# Patient Record
Sex: MALE | Race: WHITE | Employment: OTHER | URBAN - METROPOLITAN AREA
[De-identification: names, ages, dates, MRNs, and addresses within clinical notes are randomized per-mention and may not be internally consistent; named-entity substitution may affect disease eponyms.]

---

## 2024-01-30 LAB
ALBUMIN SERPL-MCNC: 4.1 G/DL
ALP BLD-CCNC: 88 U/L
ALT SERPL-CCNC: 18 U/L
ANION GAP SERPL CALCULATED.3IONS-SCNC: NORMAL MMOL/L
AST SERPL-CCNC: 19 U/L
BASOPHILS ABSOLUTE: 0 /ΜL
BASOPHILS RELATIVE PERCENT: 0.2 %
BILIRUB SERPL-MCNC: 0.4 MG/DL (ref 0.1–1.4)
BUN BLDV-MCNC: 16 MG/DL
CALCIUM SERPL-MCNC: 9.1 MG/DL
CHLORIDE BLD-SCNC: 102 MMOL/L
CHOLESTEROL, TOTAL: 135 MG/DL
CHOLESTEROL/HDL RATIO: NORMAL
CO2: 24 MMOL/L
CREAT SERPL-MCNC: 1 MG/DL
EGFR: 77
EOSINOPHILS ABSOLUTE: 0 /ΜL
EOSINOPHILS RELATIVE PERCENT: 0.6 %
FERRITIN: 310 NG/ML (ref 18–300)
GLUCOSE BLD-MCNC: 163 MG/DL
HCT VFR BLD CALC: 35.7 % (ref 41–53)
HDLC SERPL-MCNC: 58 MG/DL (ref 35–70)
HEMOGLOBIN: 11.8 G/DL (ref 13.5–17.5)
IRON % SATURATION: 45
IRON: 101
LDL CHOLESTEROL CALCULATED: 60 MG/DL (ref 0–160)
LYMPHOCYTES ABSOLUTE: 1.1 /ΜL
LYMPHOCYTES RELATIVE PERCENT: 20.8 %
MCH RBC QN AUTO: 29.3 PG
MCHC RBC AUTO-ENTMCNC: 33.1 G/DL
MCV RBC AUTO: 88.6 FL
MONOCYTES ABSOLUTE: 0.6 /ΜL
MONOCYTES RELATIVE PERCENT: 11.6 %
NEUTROPHILS ABSOLUTE: 3.6 /ΜL
NEUTROPHILS RELATIVE PERCENT: 66.6 %
NONHDLC SERPL-MCNC: NORMAL MG/DL
PDW BLD-RTO: 14.2 %
PLATELET # BLD: 193 K/ΜL
PMV BLD AUTO: 11.5 FL
POTASSIUM SERPL-SCNC: 4.2 MMOL/L
RBC # BLD: 4.03 10^6/ΜL
SODIUM BLD-SCNC: 139 MMOL/L
T4 FREE: 2.45
TOTAL IRON BINDING CAPACITY: 222
TOTAL PROTEIN: 5.8
TRIGL SERPL-MCNC: 88 MG/DL
TSH SERPL DL<=0.05 MIU/L-ACNC: 1.72 UIU/ML
VLDLC SERPL CALC-MCNC: 17 MG/DL
WBC # BLD: 5.4 10^3/ML

## 2024-02-04 NOTE — PROGRESS NOTES
Patient Name: Compa Vergara  Patient : 1945  Patient MRN: [id number]     Primary Oncologist: Codey Siddiqi MD  Referring Provider: Devon Omer MD     Date of Service: 2024      Chief Complaint:   Chief Complaint   Patient presents with    Follow-up     He came in for follow-up visit.    Patient Active Problem List:     CAD (coronary artery disease)     Hypertension     Hyperlipidemia     Diabetes mellitus (HCC)     Single implantable cardioverter-defibrillator (ICD) in situ     Automatic implantable cardioverter-defibrillator in situ     History of colon polyps     Gastroesophageal reflux disease without esophagitis     Type 2 diabetes mellitus without complication (HCC)     Patellofemoral syndrome of right knee     Implantable cardioverter-defibrillator (ICD) at end of device life     Iron deficiency anemia     Colon polyp     History of total right knee replacement     HPI:   Compa Vergara is a pleasant 78-year-old  male patient who was referred for evaluation of anemia.  He had upper and lower endoscopic study by Dr. Mondragon in . No prior history of blood transfusions.    In 2015 his white cell count was 8.4, RBC 4.51, hemoglobin 13.1, hematocrit 39.8, MCV 88.1, platelet 193, with normal differentiations.  In 2015 he had normal triglyceride, cholesterol, HDL.  TSH was 4.15.  Free T4 was 1.2.  CMP was within normal limits, except for glucose 119.  White cell count 6.6, hemoglobin 13, RBC 4.57, platelets 181, and relative monocyte was 11 percent.  In 2015 uric acid was 5.9, white cell count 5.8, RBC count 4.2, hemoglobin 12.1, hematocrit 38.1, MCV 90.7, platelets 167.    He has been taking aspirin for more than 20 years.    Blood pressure in 2015 showed white cell count 7.6, hemoglobin 12.5, platelet 175, MCV 89, absolute neutrophil 5.2, .  Iron was 97, ferritin 193, B12 529.8, folate more than 20, haptoglobin 187, retic 15.3, elevated.  One

## 2024-02-06 ENCOUNTER — TELEPHONE (OUTPATIENT)
Dept: PULMONOLOGY | Age: 79
End: 2024-02-06

## 2024-02-07 ENCOUNTER — OFFICE VISIT (OUTPATIENT)
Dept: PULMONOLOGY | Age: 79
End: 2024-02-07
Payer: MEDICARE

## 2024-02-07 VITALS
SYSTOLIC BLOOD PRESSURE: 124 MMHG | DIASTOLIC BLOOD PRESSURE: 62 MMHG | WEIGHT: 193.8 LBS | HEIGHT: 67 IN | BODY MASS INDEX: 30.42 KG/M2 | HEART RATE: 84 BPM | OXYGEN SATURATION: 98 %

## 2024-02-07 DIAGNOSIS — R06.00 DYSPNEA, UNSPECIFIED TYPE: Primary | ICD-10-CM

## 2024-02-07 PROCEDURE — G8427 DOCREV CUR MEDS BY ELIG CLIN: HCPCS | Performed by: NURSE PRACTITIONER

## 2024-02-07 PROCEDURE — G8417 CALC BMI ABV UP PARAM F/U: HCPCS | Performed by: NURSE PRACTITIONER

## 2024-02-07 PROCEDURE — 1123F ACP DISCUSS/DSCN MKR DOCD: CPT | Performed by: NURSE PRACTITIONER

## 2024-02-07 PROCEDURE — 3078F DIAST BP <80 MM HG: CPT | Performed by: NURSE PRACTITIONER

## 2024-02-07 PROCEDURE — G8484 FLU IMMUNIZE NO ADMIN: HCPCS | Performed by: NURSE PRACTITIONER

## 2024-02-07 PROCEDURE — 1036F TOBACCO NON-USER: CPT | Performed by: NURSE PRACTITIONER

## 2024-02-07 PROCEDURE — 99204 OFFICE O/P NEW MOD 45 MIN: CPT | Performed by: NURSE PRACTITIONER

## 2024-02-07 PROCEDURE — 3074F SYST BP LT 130 MM HG: CPT | Performed by: NURSE PRACTITIONER

## 2024-02-07 ASSESSMENT — ENCOUNTER SYMPTOMS: SHORTNESS OF BREATH: 1

## 2024-02-07 NOTE — PROGRESS NOTES
Compa Vergara (:  1945) is a 78 y.o. male,New patient, here for evaluation of the following chief complaint(s):  Shortness of Breath        Subjective   SUBJECTIVE/OBJECTIVE:  Compa Vergara is a very pleasant 79 yo male who was referred to pulmonary clinic because of having episodes of shortness of breath. He presents calm, relaxed and no sign or symptoms of shortness of breath at rest. He is accompanied by his wife, Melita. He has a very supportive family. Karena provided most of his health history and help with decision making.  Compa states that he quit smoking years ago and that he works outside in his garden. 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 Shortness of breath. Irregularity of right lung. He smoked 3 packs a day for 40 years. He quit smoking in . Worked outside [employer] collection. Stopped drinking, quit smoking. He does not remember being diagnosed with COPD.     He denies ever being infected with Covid-19. Both he and his spouse state that they are up to date on Covid, Flu and pneumonia vaccines. Compa states that  they both had head colds after returning to [place] from [place].  Other than that, they endorse being healthy. Wife, Karena states that Compa has a tremedous family hisitory of cancer. Compa is followed by Dr. Siddiqi, oncologist for anemia.     Compa carries co-morbidities of CAD, PAD, HLD, HTN, GERD, iron deficiency anemia, DM2 and has an AICD.     I will continue to monitor him from a pulmonary standpoint.       Shortness of Breath        Review of Systems   Respiratory:  Positive for shortness of breath.    All other systems reviewed and are negative.         Objective   Physical Exam  Vitals and nursing note reviewed. Exam conducted with a chaperone present (wife, Karena).   Constitutional:       General: He is awake.      Appearance: Normal appearance. He is well-developed and well-groomed.

## 2024-02-08 PROBLEM — R06.00 DYSPNEA: Status: ACTIVE | Noted: 2024-02-08

## 2024-02-14 ENCOUNTER — HOSPITAL ENCOUNTER (OUTPATIENT)
Dept: INFUSION THERAPY | Age: 79
Discharge: HOME | End: 2024-02-14
Payer: MEDICARE

## 2024-02-14 ENCOUNTER — OFFICE VISIT (OUTPATIENT)
Dept: ONCOLOGY | Age: 79
End: 2024-02-14
Payer: MEDICARE

## 2024-02-14 VITALS
BODY MASS INDEX: 31.85 KG/M2 | HEIGHT: 66 IN | SYSTOLIC BLOOD PRESSURE: 132 MMHG | HEART RATE: 66 BPM | WEIGHT: 198.2 LBS | TEMPERATURE: 97.7 F | OXYGEN SATURATION: 100 % | DIASTOLIC BLOOD PRESSURE: 63 MMHG

## 2024-02-14 DIAGNOSIS — D64.9 ANEMIA, UNSPECIFIED TYPE: Primary | ICD-10-CM

## 2024-02-14 PROCEDURE — 3078F DIAST BP <80 MM HG: CPT | Performed by: INTERNAL MEDICINE

## 2024-02-14 PROCEDURE — G8428 CUR MEDS NOT DOCUMENT: HCPCS | Performed by: INTERNAL MEDICINE

## 2024-02-14 PROCEDURE — G8484 FLU IMMUNIZE NO ADMIN: HCPCS | Performed by: INTERNAL MEDICINE

## 2024-02-14 PROCEDURE — 1036F TOBACCO NON-USER: CPT | Performed by: INTERNAL MEDICINE

## 2024-02-14 PROCEDURE — 99213 OFFICE O/P EST LOW 20 MIN: CPT | Performed by: INTERNAL MEDICINE

## 2024-02-14 PROCEDURE — G8417 CALC BMI ABV UP PARAM F/U: HCPCS | Performed by: INTERNAL MEDICINE

## 2024-02-14 PROCEDURE — 99211 OFF/OP EST MAY X REQ PHY/QHP: CPT

## 2024-02-14 PROCEDURE — 3075F SYST BP GE 130 - 139MM HG: CPT | Performed by: INTERNAL MEDICINE

## 2024-02-14 PROCEDURE — 1123F ACP DISCUSS/DSCN MKR DOCD: CPT | Performed by: INTERNAL MEDICINE

## 2024-02-14 NOTE — PROGRESS NOTES
MA Rooming Questions  Patient: Compa Vergara  MRN: [id number]    Date: 2/14/2024        1. Do you have any new issues?   yes - Patient states feels week and tired often. Also states does not have his usual apatite. States was told to make an appointment to go over abnormal lab results.           2. Do you need any refills on medications?    no    3. Have you had any imaging done since your last visit?   yes - CT scan- Chest area.     4. Have you been hospitalized or seen in the emergency room since your last visit here?   yes - 1/4- [facility].     5. Did the patient have a depression screening completed today? No    No data recorded     PHQ-9 Given to (if applicable):               PHQ-9 Score (if applicable):                     [] Positive     []  Negative              Does question #9 need addressed (if applicable)                     [] Yes    []  No               Maureen Clay MA

## 2024-02-26 NOTE — PROGRESS NOTES
2/27/24    Compa PHILLIPS Mandan  1945    Chief Complaint   Patient presents with    New Patient     Patient here to establish care for memory loss.        Neurologic Consult Note    Subjective    History of Present Illness  Compa is a 78 y.o. male presenting today for neurologic evaluation of memory concerns.    He is alone today as his wife is currently hospitalized.     Wife reported concerns with patients long and short term memory at appointment with PCP in November. He has Extensive past medical history Including CAD, HTN, HLD, DM 2, ICD implant, right ICA stenosis, lumbar spinal stenosis.    Compa tells me that he does not know why he is here today as his wife made the appointment. He admits to short term memory loss. He feels his long term memory is okay.     He admits to getting lost while driving if he is distracted. He has never gotten lost to the point where he cannot find his way home and has to call for help.     Regarding activities of daily living, he denies any difficulties remembering to eat. No difficulties with hygiene tasks. He continues to dress himself without difficulties.     The wife has also been the one that cooked. He helps to clean. There have been no changes to his ability to clean. He manages his medications. He denies issues with this. His wife has always managed finances.     He denies hallucinations. No personality changes. No family history of dementia.     He had two falls last year. He describes weakness in his legs. He has numbness and burning in his legs predominantly at nighttime. He admits to DM. Denies chemo .  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 He is currently on B12 and Vitamin D. He has not done balance therapy. He is on gabapentin. He does no feel it is helping currently. He has not done therapy.     B12, CMP, thyroid, CBC all WNL      Review of Symptoms:  Neurologic   Symptoms: + difficulty with gait or walking, no bowel

## 2024-02-27 ENCOUNTER — OFFICE VISIT (OUTPATIENT)
Dept: NEUROLOGY | Age: 79
End: 2024-02-27
Payer: MEDICARE

## 2024-02-27 VITALS
SYSTOLIC BLOOD PRESSURE: 136 MMHG | OXYGEN SATURATION: 95 % | HEIGHT: 67 IN | HEART RATE: 90 BPM | DIASTOLIC BLOOD PRESSURE: 68 MMHG | WEIGHT: 196.4 LBS | BODY MASS INDEX: 30.83 KG/M2

## 2024-02-27 DIAGNOSIS — E11.42 DIABETIC PERIPHERAL NEUROPATHY (HCC): Primary | ICD-10-CM

## 2024-02-27 DIAGNOSIS — R41.3 MILD MEMORY DISTURBANCE: ICD-10-CM

## 2024-02-27 PROCEDURE — 1123F ACP DISCUSS/DSCN MKR DOCD: CPT | Performed by: STUDENT IN AN ORGANIZED HEALTH CARE EDUCATION/TRAINING PROGRAM

## 2024-02-27 PROCEDURE — G8417 CALC BMI ABV UP PARAM F/U: HCPCS | Performed by: STUDENT IN AN ORGANIZED HEALTH CARE EDUCATION/TRAINING PROGRAM

## 2024-02-27 PROCEDURE — G8427 DOCREV CUR MEDS BY ELIG CLIN: HCPCS | Performed by: STUDENT IN AN ORGANIZED HEALTH CARE EDUCATION/TRAINING PROGRAM

## 2024-02-27 PROCEDURE — 99205 OFFICE O/P NEW HI 60 MIN: CPT | Performed by: STUDENT IN AN ORGANIZED HEALTH CARE EDUCATION/TRAINING PROGRAM

## 2024-02-27 PROCEDURE — G8484 FLU IMMUNIZE NO ADMIN: HCPCS | Performed by: STUDENT IN AN ORGANIZED HEALTH CARE EDUCATION/TRAINING PROGRAM

## 2024-02-27 PROCEDURE — 3075F SYST BP GE 130 - 139MM HG: CPT | Performed by: STUDENT IN AN ORGANIZED HEALTH CARE EDUCATION/TRAINING PROGRAM

## 2024-02-27 PROCEDURE — 1036F TOBACCO NON-USER: CPT | Performed by: STUDENT IN AN ORGANIZED HEALTH CARE EDUCATION/TRAINING PROGRAM

## 2024-02-27 PROCEDURE — 3078F DIAST BP <80 MM HG: CPT | Performed by: STUDENT IN AN ORGANIZED HEALTH CARE EDUCATION/TRAINING PROGRAM

## 2024-03-14 ENCOUNTER — HOSPITAL ENCOUNTER (OUTPATIENT)
Dept: CT IMAGING | Age: 79
Discharge: HOME | End: 2024-03-14
Attending: STUDENT IN AN ORGANIZED HEALTH CARE EDUCATION/TRAINING PROGRAM
Payer: MEDICARE

## 2024-03-14 DIAGNOSIS — R41.3 MILD MEMORY DISTURBANCE: ICD-10-CM

## 2024-03-14 PROCEDURE — 70450 CT HEAD/BRAIN W/O DYE: CPT

## 2024-04-02 ENCOUNTER — HOSPITAL ENCOUNTER (OUTPATIENT)
Dept: PULMONOLOGY | Age: 79
Discharge: HOME | End: 2024-04-02
Payer: MEDICARE

## 2024-04-02 DIAGNOSIS — R06.00 DYSPNEA, UNSPECIFIED TYPE: ICD-10-CM

## 2024-04-02 LAB
DISTANCE WALKED: 510 FT
DLCO %PRED: 79 %
DLCO PRED: NORMAL
DLCO/VA %PRED: NORMAL
DLCO/VA PRED: NORMAL
DLCO/VA: NORMAL
DLCO: NORMAL
EXPIRATORY TIME-POST: NORMAL
EXPIRATORY TIME: NORMAL
FEF 25-75 %CHNG: 12
FEF 25-75 POST %PRED: 95 %
FEF 25-75% %PRED-PRE: 84 L/SEC
FEF 25-75% PRED: NORMAL
FEF 25-75-POST: NORMAL
FEF 25-75-PRE: NORMAL
FEV1 %PRED-POST: 73 %
FEV1 %PRED-PRE: 71 %
FEV1 PRED: NORMAL
FEV1-POST: NORMAL
FEV1-PRE: NORMAL
FEV1/FVC %PRED-POST: 105 %
FEV1/FVC %PRED-PRE: 100 %
FEV1/FVC PRED: NORMAL
FEV1/FVC-POST: NORMAL
FEV1/FVC-PRE: NORMAL
FVC %PRED-POST: 69 L
FVC %PRED-PRE: 70 %
FVC PRED: NORMAL
FVC-POST: NORMAL
FVC-PRE: NORMAL
GAW %PRED: NORMAL
GAW PRED: NORMAL
GAW: NORMAL
IC PRE %PRED: NORMAL
IC PRED: NORMAL
IC: NORMAL
MEP: NORMAL
MIP: NORMAL
MVV %PRED-PRE: NORMAL
MVV PRED: NORMAL
MVV-PRE: NORMAL
PEF %PRED-POST: NORMAL
PEF %PRED-PRE: NORMAL
PEF PRED: NORMAL
PEF%CHNG: NORMAL
PEF-POST: NORMAL
PEF-PRE: NORMAL
RAW %PRED: NORMAL
RAW PRED: NORMAL
RAW: NORMAL
RV PRE %PRED: NORMAL
RV PRED: NORMAL
RV: NORMAL
SPO2: NORMAL %
SVC %PRED: NORMAL
SVC PRED: NORMAL
SVC: NORMAL
TLC PRE %PRED: 75 %
TLC PRED: NORMAL
TLC: NORMAL
VA %PRED: NORMAL
VA PRED: NORMAL
VA: NORMAL
VTG %PRED: NORMAL
VTG PRED: NORMAL
VTG: NORMAL

## 2024-04-02 PROCEDURE — 94726 PLETHYSMOGRAPHY LUNG VOLUMES: CPT

## 2024-04-02 PROCEDURE — 94729 DIFFUSING CAPACITY: CPT

## 2024-04-02 PROCEDURE — 94150 VITAL CAPACITY TEST: CPT

## 2024-04-02 PROCEDURE — 94762 N-INVAS EAR/PLS OXIMTRY CONT: CPT

## 2024-04-02 PROCEDURE — 94618 PULMONARY STRESS TESTING: CPT

## 2024-04-02 PROCEDURE — 94060 EVALUATION OF WHEEZING: CPT

## 2024-04-02 ASSESSMENT — PULMONARY FUNCTION TESTS
FEV1/FVC_PERCENT_PREDICTED_PRE: 100
FVC_PERCENT_PREDICTED_PRE: 70
FEV1/FVC_PERCENT_PREDICTED_POST: 105
FEV1_PERCENT_PREDICTED_POST: 73
FEV1_PERCENT_PREDICTED_PRE: 71
FVC_PERCENT_PREDICTED_POST: 69

## 2024-04-02 NOTE — PROGRESS NOTES
4/2/2024 11:39 AM  Patient Room #: Room/bed info not found  Patient Name: Compa Vergara Sr.    (Step 1 Done by RN if possible otherwise call Pulmonary Diagnostics)  Place patient on room air at rest for at least 30 minutes.  If patient falls below 88% before 30 minutes then you can record the level and stop.  Record room air saturation level _98_ %.  If patient is at 88% or below, they will qualify for home oxygen and you can stop.  If level does not fall below 88%, fill in level above. If indicated continue to Step 2.   Signature:_Harrison Sheffield RRT____ Date: _04/03/2024__  (Step 2&3 Done by P)  Ambulate patient on room air until saturation falls below 89%.  Record level of room air saturation with ambulation_96__ %.  Next, place patient back on ___lpm oxygen and ambulate, record level __%.  (Note:  this level must show improvement from room air level done with ambulation.)  If patient’s saturation on room air with ambulation is 88% or below AND patient shows improvement with oxygen during ambulation, they will qualify for home oxygen and you can stop.  If patient does not drop below 89%, then patient should have an overnight oximetry trending on room air to see if level falls below 88%.  Complete level in Step 3 below.    Room air overnight oximetry level 88 % for_0__  cumulative minutes.  If patient’s room air oxygen level is below <89% for any amount of time they will qualify for nocturnal home oxygen.        (Attach Night Trending Report)    Complete order below: Diagnosis:____________________________  Home oxygen at:  Length of Need: ? Lifetime ? 3 Months     ___lpm or __%   via  [] nasal cannula  []mask  [] other         []continuous []  with activity  []  Nocturnal   [] Portable Tanks []  Concentrator  [] Conserving Device        Therapist Signature:_Harrison Sheffield RRT______     Date:  _04/03/2024__    Physician Signature:  ________________________    Date:_______________  Physician

## 2024-04-02 NOTE — PROGRESS NOTES
[facility] Pulmonary Function Lab - Six Minute Walk  Test Performed on: Room Air___X__ Oxygen at _____ lpm via N/C  Assist Device Used During Test:    None_X___ Cane____ Walker___   Shortness of Breath - Rasheed's Scale  0 Nothing at all 5 Severe    0.5 Very very slight 6    1 Very slight 7 Very severe   2 Slight 8     3 Moderate 9 Very very severe   4 Somewhat severe 10 Maximal      Time SpO2 Heart Rate Respiratory Rate Modified Rasheed’s Scale Other      Baseline   98              %  64 24                1 minute               96              % 88   1           2 minutes         97              %  92  2           3 minutes         96               %  92     2           4 minutes       96               %  93      3           5 minutes   97               %  97      3           6 minutes      98               %  97     3        Recovery   x 1 Min           97               %  91 24 1        Recovery   x 2 Min            99               %  85                    Number of Laps_3_ X 170 feet _510_+ _0_ additional feet = Total _510_feet  Stopped or paused before 6 minutes? No_X___ Yes _____   Pre Blood Pressure: _155_ / _72__    Post Blood Pressure:_157  _/_72__  Interpretation:

## 2024-04-02 NOTE — PROGRESS NOTES
Incentive Spirometry:     Predicted: 2050  Achieved: 1400       Instructions given on the proper use of an IS.

## 2024-04-17 ENCOUNTER — OFFICE VISIT (OUTPATIENT)
Dept: PULMONOLOGY | Age: 79
End: 2024-04-17
Payer: MEDICARE

## 2024-04-17 VITALS
SYSTOLIC BLOOD PRESSURE: 122 MMHG | OXYGEN SATURATION: 96 % | DIASTOLIC BLOOD PRESSURE: 62 MMHG | WEIGHT: 195 LBS | HEART RATE: 82 BPM | HEIGHT: 67 IN | BODY MASS INDEX: 30.61 KG/M2

## 2024-04-17 DIAGNOSIS — J98.4 RESTRICTIVE AIRWAY DISEASE: Primary | ICD-10-CM

## 2024-04-17 DIAGNOSIS — R06.00 DYSPNEA, UNSPECIFIED TYPE: ICD-10-CM

## 2024-04-17 PROBLEM — J44.9 CHRONIC OBSTRUCTIVE PULMONARY DISEASE (HCC): Status: ACTIVE | Noted: 2024-04-17

## 2024-04-17 PROCEDURE — 3078F DIAST BP <80 MM HG: CPT | Performed by: NURSE PRACTITIONER

## 2024-04-17 PROCEDURE — 3074F SYST BP LT 130 MM HG: CPT | Performed by: NURSE PRACTITIONER

## 2024-04-17 PROCEDURE — 99214 OFFICE O/P EST MOD 30 MIN: CPT | Performed by: NURSE PRACTITIONER

## 2024-04-17 PROCEDURE — 1123F ACP DISCUSS/DSCN MKR DOCD: CPT | Performed by: NURSE PRACTITIONER

## 2024-04-17 ASSESSMENT — ENCOUNTER SYMPTOMS: SHORTNESS OF BREATH: 1

## 2024-04-17 NOTE — PROGRESS NOTES
Compa Vergara Sr. (:  1945) is a 78 y.o. male,Established patient, here for evaluation of the following chief complaint(s):  No chief complaint on file.      Subjective   SUBJECTIVE/OBJECTIVE:  Compa Vergara is a very pleasant 77 yo male who was referred to pulmonary clinic because of having episodes of shortness of breath is here to discuss results of PFT, overnight oxygen testing and 6 minute walk testing. He presents calm, relaxed and no sign or symptoms of shortness of breath at rest. He is accompanied by his wife, Karena.     Results of PFT reveals possible moderate restriction with a normal DLCO. FEV1 is 70% predicted and FVC is 71% predicted., but the FEV1/FVC ratio is normal at 77% predicted. DLCO is 81.     There ,was no significant desaturation during the 6 minute walk test. There were no pauses. HR remained less that 100 bpm. No oxygen is required for daytime use.     Overnight pulse study showed a drop in oxygenation for less than 10 seconds. Lowest drop was 88% but overall saturation remained above 89%. No night time oxygen is required at this time.     Karena states that Compa is very active at home. He is cutting the grass and doing chores around the house. It is only when he bends over or exerts himself that he is a little short of breath. He is using no bronchodilators at this time and reports that he does not seem to need them. I will continue to monitor him for needs.       Review of Systems   Respiratory:  Positive for shortness of breath.         N exertion and when bending over.    All other systems reviewed and are negative.     Objective   Physical Exam  Vitals and nursing note reviewed. Exam conducted with a chaperone present (wife, Karena).   Constitutional:       General: He is awake.      Appearance: Normal appearance. He is well-developed, well-groomed and overweight.   HENT:      Mouth/Throat:      Mouth: Mucous membranes are moist.      Pharynx: Oropharynx is

## 2024-05-06 ENCOUNTER — PROCEDURE VISIT (OUTPATIENT)
Dept: NEUROLOGY | Age: 79
End: 2024-05-06
Payer: MEDICARE

## 2024-05-06 DIAGNOSIS — M54.17 LUMBOSACRAL RADICULOPATHY: Primary | ICD-10-CM

## 2024-05-06 PROCEDURE — 95886 MUSC TEST DONE W/N TEST COMP: CPT | Performed by: PHYSICAL MEDICINE & REHABILITATION

## 2024-05-06 PROCEDURE — 95910 NRV CNDJ TEST 7-8 STUDIES: CPT | Performed by: PHYSICAL MEDICINE & REHABILITATION

## 2024-05-06 NOTE — PROGRESS NOTES
EMG    Risks and benefits of study discussed.    Specific and common risks of pain and bleeding as well as uncommon side effects of infection, hematoma and vasovagal episodes    Patient agreeable to testing and consents to such.    Clinical: Many years of lumbar pain, neurogenic claudication, lower limb weakness.  Had a single level lumbar surgery at L3-4 but no further surgical interventions.    Motor NCS:  Tibial amplitudes, latencies and velocities are normal bilateral  Peroneal amplitude is severely depressed on the right normal on the left, latencies mildly prolonged on the right, with conduction velocities normal on both sides    Sensory NCS:  Sural and peroneal responses are absent bilaterally    Needle EMG: Mild to moderate right greater than left L5 and S1 muscle chronic neurogenic changes of motor unit enlargement.    Impression:  #1.  Mild to moderate right, and mild left chronic L5-S1 neurogenic changes consistent with chronic lumbosacral radiculopathies.  #2.  Additionally, absent sensory responses indicate some component of a generalized peripheral neuropathy also affecting the lower extremities.

## 2024-06-11 ENCOUNTER — HOSPITAL ENCOUNTER (OUTPATIENT)
Dept: INFUSION THERAPY | Age: 79
Discharge: HOME | End: 2024-06-11
Payer: MEDICARE

## 2024-06-11 DIAGNOSIS — D64.9 ANEMIA, UNSPECIFIED TYPE: ICD-10-CM

## 2024-06-11 LAB
BASOPHILS ABSOLUTE: 0 K/CU MM
BASOPHILS RELATIVE PERCENT: 0.3 % (ref 0–1)
DIFFERENTIAL TYPE: ABNORMAL
EOSINOPHILS ABSOLUTE: 0.1 K/CU MM
EOSINOPHILS RELATIVE PERCENT: 0.7 % (ref 0–3)
FERRITIN: 234 NG/ML (ref 30–400)
FOLATE SERPL-MCNC: 10.1 NG/ML (ref 3.1–17.5)
HCT VFR BLD CALC: 35.8 % (ref 42–52)
HEMOGLOBIN: 11.6 GM/DL (ref 13.5–18)
IRON: 97 UG/DL (ref 59–158)
LYMPHOCYTES ABSOLUTE: 2 K/CU MM
LYMPHOCYTES RELATIVE PERCENT: 28.8 % (ref 24–44)
MCH RBC QN AUTO: 29.4 PG (ref 27–31)
MCHC RBC AUTO-ENTMCNC: 32.4 % (ref 32–36)
MCV RBC AUTO: 90.6 FL (ref 78–100)
MONOCYTES ABSOLUTE: 0.9 K/CU MM
MONOCYTES RELATIVE PERCENT: 13.2 % (ref 0–4)
NEUTROPHILS ABSOLUTE: 3.9 K/CU MM
NEUTROPHILS RELATIVE PERCENT: 57 % (ref 36–66)
PCT TRANSFERRIN: 40 % (ref 10–44)
PDW BLD-RTO: 14.3 % (ref 11.7–14.9)
PLATELET # BLD: 182 K/CU MM (ref 140–440)
PMV BLD AUTO: 9.8 FL (ref 7.5–11.1)
RBC # BLD: 3.95 M/CU MM (ref 4.6–6.2)
TOTAL IRON BINDING CAPACITY: 241 UG/DL (ref 250–450)
UNSATURATED IRON BINDING CAPACITY: 144 UG/DL (ref 110–370)
VITAMIN B-12: 670.6 PG/ML (ref 211–911)
WBC # BLD: 6.8 K/CU MM (ref 4–10.5)

## 2024-06-11 PROCEDURE — 83550 IRON BINDING TEST: CPT

## 2024-06-11 PROCEDURE — 83540 ASSAY OF IRON: CPT

## 2024-06-11 PROCEDURE — 82728 ASSAY OF FERRITIN: CPT

## 2024-06-11 PROCEDURE — 36415 COLL VENOUS BLD VENIPUNCTURE: CPT

## 2024-06-11 PROCEDURE — 82607 VITAMIN B-12: CPT

## 2024-06-11 PROCEDURE — 85025 COMPLETE CBC W/AUTO DIFF WBC: CPT

## 2024-06-11 PROCEDURE — 82746 ASSAY OF FOLIC ACID SERUM: CPT

## 2024-06-18 ENCOUNTER — HOSPITAL ENCOUNTER (OUTPATIENT)
Dept: INFUSION THERAPY | Age: 79
Discharge: HOME | End: 2024-06-18
Payer: MEDICARE

## 2024-06-18 ENCOUNTER — OFFICE VISIT (OUTPATIENT)
Dept: ONCOLOGY | Age: 79
End: 2024-06-18
Payer: MEDICARE

## 2024-06-18 VITALS
WEIGHT: 193 LBS | HEART RATE: 73 BPM | SYSTOLIC BLOOD PRESSURE: 138 MMHG | OXYGEN SATURATION: 98 % | TEMPERATURE: 97.4 F | BODY MASS INDEX: 30.29 KG/M2 | DIASTOLIC BLOOD PRESSURE: 69 MMHG | HEIGHT: 67 IN

## 2024-06-18 DIAGNOSIS — D64.9 ANEMIA, UNSPECIFIED TYPE: Primary | ICD-10-CM

## 2024-06-18 PROCEDURE — 1123F ACP DISCUSS/DSCN MKR DOCD: CPT | Performed by: INTERNAL MEDICINE

## 2024-06-18 PROCEDURE — 99211 OFF/OP EST MAY X REQ PHY/QHP: CPT

## 2024-06-18 PROCEDURE — 3075F SYST BP GE 130 - 139MM HG: CPT | Performed by: INTERNAL MEDICINE

## 2024-06-18 PROCEDURE — 3078F DIAST BP <80 MM HG: CPT | Performed by: INTERNAL MEDICINE

## 2024-06-18 PROCEDURE — 99213 OFFICE O/P EST LOW 20 MIN: CPT | Performed by: INTERNAL MEDICINE

## 2024-06-18 ASSESSMENT — PATIENT HEALTH QUESTIONNAIRE - PHQ9
SUM OF ALL RESPONSES TO PHQ QUESTIONS 1-9: 0
1. LITTLE INTEREST OR PLEASURE IN DOING THINGS: NOT AT ALL
SUM OF ALL RESPONSES TO PHQ QUESTIONS 1-9: 0
2. FEELING DOWN, DEPRESSED OR HOPELESS: NOT AT ALL
SUM OF ALL RESPONSES TO PHQ QUESTIONS 1-9: 0
SUM OF ALL RESPONSES TO PHQ9 QUESTIONS 1 & 2: 0
SUM OF ALL RESPONSES TO PHQ QUESTIONS 1-9: 0

## 2024-06-18 NOTE — PROGRESS NOTES
MA Rooming Questions  Patient: Compa Vergara Sr.  MRN: [id number]    Date: 6/18/2024        1. Do you have any new issues?   no         2. Do you need any refills on medications?    no    3. Have you had any imaging done since your last visit?   yes - labs 6/11    4. Have you been hospitalized or seen in the emergency room since your last visit here?   no    5. Did the patient have a depression screening completed today? Yes    PHQ-9 Total Score: 0 (6/18/2024  8:55 AM)       PHQ-9 Given to (if applicable):               PHQ-9 Score (if applicable):                     [] Positive     []  Negative              Does question #9 need addressed (if applicable)                     [] Yes    []  No               Carmen Shaikh, CMA      
Ferritin 286, TIBC 142. Stable for him. He takes oral Iron QOD.   6/2023 WBC 6.5, Hg 11.9, MCV 92.1, plat 181. Ferritin 277, TIBC 24  6/2023 WBC 6.5, Hg 11.9, MCV 92.1, plat 181. Ferritin 277, TIBC 240.  He has intermittent diarrhea for couple months. He was seen by Dr Mondragon and reportedly had colonoscopy > 5 years ago.  In 8/2023 he had EGD, colonoscopy and capsule endoscopic study by Dr Swanson.  12/11/2023 folate 14.7, B-12 1582. WBC 7.8, Hg 12.7, MCV 89.2, plat 209. Ferritin 295, Iron 104, TIBC 210, sat 50%.   He may hold B-12 supplement.   1/30/2024 CMP wnl, TSH wnl.  WBC 5.4, Hg 11.8, MCV 88.6, plat 193. Ferritin 310. Iron 101, TIBC 222.  6/11/2024 Folate 10.1, B-12 670.6.  WBC 6.8, Hg 11.6, MCV 90.6, plat 182. Ferritin 234, Iron 97, TIBC 241, sat 40.   6/11/2024 Folate 10.1, B-12 670.6.  WBC 6.8, Hg 11.6, MCV 90.6, plat 182. Ferritin 234, Iron 97, TIBC 241, sat 40. On ASA. On oral Iron TIW.      Repeat labs prior to next OV.  If he has worsening anemia, I will consider to repeat bone marrow study.    2.  He had pacemaker/defibrillator replacement on November 27, 2017.       3.  2/1/2024 LDCT chest: Lung rads 2. FU in one year.    I encourage to eat frequent meals.     RTC in 6 months or sooner.  All of his questions have been answered for today.      Recent imaging and labs were reviewed and discussed with the patient.

## 2024-12-11 ENCOUNTER — HOSPITAL ENCOUNTER (OUTPATIENT)
Dept: INFUSION THERAPY | Age: 79
Discharge: HOME | End: 2024-12-11
Payer: MEDICARE

## 2024-12-11 DIAGNOSIS — D64.9 ANEMIA, UNSPECIFIED TYPE: ICD-10-CM

## 2024-12-11 LAB
BASOPHILS # BLD: 0.02 K/UL
BASOPHILS NFR BLD: 0 % (ref 0–1)
EOSINOPHIL # BLD: 0.14 K/UL
EOSINOPHILS RELATIVE PERCENT: 2 % (ref 0–3)
ERYTHROCYTE [DISTWIDTH] IN BLOOD BY AUTOMATED COUNT: 15.1 % (ref 11.7–14.9)
FERRITIN SERPL-MCNC: 184 NG/ML (ref 30–400)
FOLATE SERPL-MCNC: 10.2 NG/ML (ref 4.8–24.2)
HCT VFR BLD AUTO: 34.8 % (ref 42–52)
HGB BLD-MCNC: 11.3 G/DL (ref 13.5–18)
IRON SATN MFR SERPL: 41 % (ref 15–50)
IRON SERPL-MCNC: 94 UG/DL (ref 59–158)
LYMPHOCYTES NFR BLD: 1.88 K/UL
LYMPHOCYTES RELATIVE PERCENT: 24 % (ref 24–44)
MCH RBC QN AUTO: 29.3 PG (ref 27–31)
MCHC RBC AUTO-ENTMCNC: 32.5 G/DL (ref 32–36)
MCV RBC AUTO: 90.2 FL (ref 78–100)
MONOCYTES NFR BLD: 1.06 K/UL
MONOCYTES NFR BLD: 14 % (ref 0–4)
NEUTROPHILS NFR BLD: 61 % (ref 36–66)
NEUTS SEG NFR BLD: 4.77 K/UL
PLATELET # BLD AUTO: 186 K/UL (ref 140–440)
PMV BLD AUTO: 9.8 FL (ref 7.5–11.1)
RBC # BLD AUTO: 3.86 M/UL (ref 4.6–6.2)
TIBC SERPL-MCNC: 229 UG/DL (ref 260–445)
UNSATURATED IRON BINDING CAPACITY: 135 UG/DL (ref 110–370)
VIT B12 SERPL-MCNC: 764 PG/ML (ref 211–911)
WBC OTHER # BLD: 7.9 K/UL (ref 4–10.5)

## 2024-12-11 PROCEDURE — 85025 COMPLETE CBC W/AUTO DIFF WBC: CPT

## 2024-12-11 PROCEDURE — 82746 ASSAY OF FOLIC ACID SERUM: CPT

## 2024-12-11 PROCEDURE — 82728 ASSAY OF FERRITIN: CPT

## 2024-12-11 PROCEDURE — 36415 COLL VENOUS BLD VENIPUNCTURE: CPT

## 2024-12-11 PROCEDURE — 82607 VITAMIN B-12: CPT

## 2024-12-11 PROCEDURE — 83540 ASSAY OF IRON: CPT

## 2024-12-11 PROCEDURE — 83550 IRON BINDING TEST: CPT

## 2024-12-18 ENCOUNTER — OFFICE VISIT (OUTPATIENT)
Dept: ONCOLOGY | Age: 79
End: 2024-12-18
Payer: MEDICARE

## 2024-12-18 ENCOUNTER — HOSPITAL ENCOUNTER (OUTPATIENT)
Dept: INFUSION THERAPY | Age: 79
Discharge: HOME | End: 2024-12-18
Payer: MEDICARE

## 2024-12-18 VITALS
HEART RATE: 70 BPM | HEIGHT: 67 IN | SYSTOLIC BLOOD PRESSURE: 116 MMHG | TEMPERATURE: 97.8 F | BODY MASS INDEX: 30.7 KG/M2 | DIASTOLIC BLOOD PRESSURE: 48 MMHG | OXYGEN SATURATION: 100 % | WEIGHT: 195.6 LBS

## 2024-12-18 DIAGNOSIS — D64.9 ANEMIA, UNSPECIFIED TYPE: Primary | ICD-10-CM

## 2024-12-18 PROCEDURE — 1159F MED LIST DOCD IN RCRD: CPT | Performed by: INTERNAL MEDICINE

## 2024-12-18 PROCEDURE — 3074F SYST BP LT 130 MM HG: CPT | Performed by: INTERNAL MEDICINE

## 2024-12-18 PROCEDURE — 99211 OFF/OP EST MAY X REQ PHY/QHP: CPT

## 2024-12-18 PROCEDURE — 1126F AMNT PAIN NOTED NONE PRSNT: CPT | Performed by: INTERNAL MEDICINE

## 2024-12-18 PROCEDURE — 3078F DIAST BP <80 MM HG: CPT | Performed by: INTERNAL MEDICINE

## 2024-12-18 PROCEDURE — 99213 OFFICE O/P EST LOW 20 MIN: CPT | Performed by: INTERNAL MEDICINE

## 2024-12-18 PROCEDURE — 1123F ACP DISCUSS/DSCN MKR DOCD: CPT | Performed by: INTERNAL MEDICINE

## 2024-12-18 ASSESSMENT — PATIENT HEALTH QUESTIONNAIRE - PHQ9
SUM OF ALL RESPONSES TO PHQ QUESTIONS 1-9: 0
1. LITTLE INTEREST OR PLEASURE IN DOING THINGS: NOT AT ALL
2. FEELING DOWN, DEPRESSED OR HOPELESS: NOT AT ALL
SUM OF ALL RESPONSES TO PHQ9 QUESTIONS 1 & 2: 0
SUM OF ALL RESPONSES TO PHQ QUESTIONS 1-9: 0

## 2024-12-18 NOTE — PROGRESS NOTES
MA Rooming Questions  Patient: Compa Vergara Sr.  MRN: [id number]    Date: 12/18/2024        1. Do you have any new issues?   yes - weakness         2. Do you need any refills on medications?    no    3. Have you had any imaging done since your last visit?   yes - labs 12/11    4. Have you been hospitalized or seen in the emergency room since your last visit here?   no    5. Did the patient have a depression screening completed today? Yes    PHQ-9 Total Score: 0 (12/18/2024  9:19 AM)       PHQ-9 Given to (if applicable):               PHQ-9 Score (if applicable):                     [] Positive     []  Negative              Does question #9 need addressed (if applicable)                     [] Yes    []  No               Carmen Shaikh CMA      
Value Date    WBC 7.9 12/11/2024    RBC 3.86 (L) 12/11/2024    HGB 11.3 (L) 12/11/2024    HCT 34.8 (L) 12/11/2024    MCV 90.2 12/11/2024    MCH 29.3 12/11/2024    MCHC 32.5 12/11/2024    RDW 15.1 (H) 12/11/2024     12/11/2024    MPV 9.8 12/11/2024    BASOPCT 0 12/11/2024    LYMPHOPCT 24 12/11/2024    MONOPCT 14 (H) 12/11/2024    EOSABS 0.14 12/11/2024    BASOSABS 0.02 12/11/2024    LYMPHSABS 1.88 12/11/2024    MONOSABS 1.06 12/11/2024    DIFFTYPE AUTOMATED DIFFERENTIAL 06/11/2024    POLYCHROM 1+ 01/27/2018    PLTM PLATELETS APPEAR NORMAL 11/08/2017     Chemistry:  Lab Results   Component Value Date     01/30/2024    K 4.2 01/30/2024     01/30/2024    CO2 24 01/30/2024    BUN 16 01/30/2024    CREATININE 1.0 01/30/2024    GLUCOSE 163 01/30/2024    CALCIUM 9.1 01/30/2024    BILITOT 0.4 01/30/2024    ALKPHOS 88 01/30/2024    AST 19 01/30/2024    ALT 18 01/30/2024    LABGLOM 77 01/30/2024    GFRAA >60 06/22/2020    PHOS 2.9 11/24/2017    MG 1.8 11/24/2017    POCGLU 158 (H) 06/30/2020     Lab Results   Component Value Date     10/05/2015     Lab Results   Component Value Date    TSHHS 2.840 11/22/2019    T4FREE 2.45 01/30/2024    FT3 3.0 08/15/2017     Coagulation Panel:  Lab Results   Component Value Date    PROTIME 10.5 11/24/2017    INR 0.92 11/24/2017    APTT 25.3 11/24/2017     Anemia Panel:  Lab Results   Component Value Date    YQLBHSUG31 764 12/11/2024    FOLATE 10.2 12/11/2024     Tumor Markers:  Lab Results   Component Value Date    PSA 0.54 08/15/2017      Observations:  PHQ-9 Total Score: 0 (12/18/2024  9:19 AM)        Assessment & Plan:  1. He has anemia at least since January 2015.    Bone marrow study in November 2017 was unremarkable.  No sign of myelodysplastic changes.  Iron store was 4+.  He takes daily Iron.  He had EGD and colonoscopy in December 2018.  Labs in November 2019 was stable.  CBC and iron study in June 2020 was stable.  CBC and iron study in December 2020 was

## 2025-04-15 ENCOUNTER — OFFICE VISIT (OUTPATIENT)
Dept: PULMONOLOGY | Age: 80
End: 2025-04-15
Payer: MEDICARE

## 2025-04-15 VITALS — BODY MASS INDEX: 30.61 KG/M2 | HEIGHT: 67 IN | WEIGHT: 195 LBS | OXYGEN SATURATION: 95 % | HEART RATE: 77 BPM

## 2025-04-15 DIAGNOSIS — J98.4 RESTRICTIVE AIRWAY DISEASE: Primary | ICD-10-CM

## 2025-04-15 PROCEDURE — 1123F ACP DISCUSS/DSCN MKR DOCD: CPT | Performed by: NURSE PRACTITIONER

## 2025-04-15 PROCEDURE — 99213 OFFICE O/P EST LOW 20 MIN: CPT | Performed by: NURSE PRACTITIONER

## 2025-04-15 PROCEDURE — 1160F RVW MEDS BY RX/DR IN RCRD: CPT | Performed by: NURSE PRACTITIONER

## 2025-04-15 PROCEDURE — 1159F MED LIST DOCD IN RCRD: CPT | Performed by: NURSE PRACTITIONER

## 2025-04-19 ASSESSMENT — ENCOUNTER SYMPTOMS: SHORTNESS OF BREATH: 1

## 2025-04-19 NOTE — PROGRESS NOTES
Compa Vergraa Sr. (:  1945) is a 79 y.o. male,Established patient, here for evaluation of the following chief complaint(s):  Follow-up (1yr f/u )    Subjective   SUBJECTIVE/OBJECTIVE:  Compa Vergara is a very pleasant 80 yo male has returned pulmonary clinic for a follow up visit. He presents calm, relaxed and no sign or symptoms of shortness of breath at rest. He is accompanied by his wife, Melita.     He denies ever being infected with Covid-19, influenza A/B, pneumonia over the last 6 months. States that he stays relatively healthy.  Both he and his spouse state that they avoid public gatherings and being around sick persons.     Compa remains active in the home. He still mows his lawn, but has to take breaks more often to catch his breath. He feels like his energy is slowing down, but denies needing to use Albuterol and does not use it often. Bot he and is spouse continue to stay up to date on yearly vaccines.       Review of Systems   Respiratory:  Positive for shortness of breath.         Mild SOB with activity.    All other systems reviewed and are negative.     Objective   Physical Exam  Vitals reviewed. Exam conducted with a chaperone present (wife Karena).   Constitutional:       General: He is awake.      Appearance: Normal appearance. He is well-developed, well-groomed and overweight.   HENT:      Mouth/Throat:      Mouth: Mucous membranes are moist.      Pharynx: Oropharynx is clear.   Eyes:      Extraocular Movements: Extraocular movements intact.      Conjunctiva/sclera: Conjunctivae normal.      Pupils: Pupils are equal, round, and reactive to light.   Cardiovascular:      Rate and Rhythm: Normal rate and regular rhythm.      Pulses: Normal pulses.      Heart sounds: Normal heart sounds.   Pulmonary:      Effort: Pulmonary effort is normal.      Breath sounds: Normal breath sounds.   Musculoskeletal:         General: Normal range of motion.      Cervical back: Normal range

## 2025-04-19 NOTE — ASSESSMENT & PLAN NOTE
Albuterol inhaler prescribed 2 puffs every 4 to 6 hours as needed when SOB/wheezing.   Advised to continue staying active.  Advised continual use of incentive spirometry breathing exercises to mobilize secretions and improve lung expansion.

## 2025-05-26 NOTE — PROGRESS NOTES
Patient Name: Compa Vergara .  Patient : 1945  Patient MRN: [id number]     Primary Oncologist: Codey Siddiqi MD  Referring Provider: Devon Omer MD     Date of Service: 2025      Chief Complaint:   No chief complaint on file.    He came in for follow-up visit.    Patient Active Problem List:     CAD (coronary artery disease)     Hypertension     Hyperlipidemia     Diabetes mellitus (HCC)     Single implantable cardioverter-defibrillator (ICD) in situ     Automatic implantable cardioverter-defibrillator in situ     History of colon polyps     Gastroesophageal reflux disease without esophagitis     Type 2 diabetes mellitus without complication (HCC)     Patellofemoral syndrome of right knee     Implantable cardioverter-defibrillator (ICD) at end of device life     Iron deficiency anemia     Colon polyp     History of total right knee replacement     HPI:   Compa Vergara is a pleasant 80-year-old  male patient who was referred for evaluation of anemia.  He had upper and lower endoscopic study by Dr. Mondragon in . No prior history of blood transfusions.    2015 WBC 8.4, RBC 4.51, hemoglobin 13.1, hematocrit 39.8, MCV 88.1, platelet 193.  2015 he had normal triglyceride, cholesterol, HDL.  TSH 4.15.  Free T4 1.2.  CMP within normal limits, except for glucose 119.  White cell count 6.6, hemoglobin 13, RBC 4.57, platelets 181, and relative monocyte was 11 percent.  In 2015 uric acid 5.9, white cell count 5.8, RBC count 4.2, hemoglobin 12.1, hematocrit 38.1, MCV 90.7, platelets 167.    He has been taking aspirin for more than 20 years.    2015 white cell count 7.6, hemoglobin 12.5, platelet 175, MCV 89, absolute neutrophil 5.2, .  Iron was 97, ferritin 193, B12 529.8, folate more than 20, haptoglobin 187, retic 15.3, elevated.  One stool guaiac was positive out of three.     He did not remember that he had an upper endoscopic study in 2015.

## 2025-06-13 ENCOUNTER — HOSPITAL ENCOUNTER (OUTPATIENT)
Dept: INFUSION THERAPY | Age: 80
Discharge: HOME | End: 2025-06-13
Payer: MEDICARE

## 2025-06-13 DIAGNOSIS — D64.9 ANEMIA, UNSPECIFIED TYPE: ICD-10-CM

## 2025-06-13 LAB
BASOPHILS # BLD: 0.01 K/UL
BASOPHILS NFR BLD: 0 % (ref 0–1)
EOSINOPHIL # BLD: 0.06 K/UL
EOSINOPHILS RELATIVE PERCENT: 1 % (ref 0–3)
ERYTHROCYTE [DISTWIDTH] IN BLOOD BY AUTOMATED COUNT: 14.9 % (ref 11.7–14.9)
FERRITIN SERPL-MCNC: 178 NG/ML (ref 30–400)
HCT VFR BLD AUTO: 37.2 % (ref 42–52)
HGB BLD-MCNC: 12 G/DL (ref 13.5–18)
IRON SATN MFR SERPL: 31 % (ref 15–50)
IRON SERPL-MCNC: 70 UG/DL (ref 59–158)
LYMPHOCYTES NFR BLD: 1.99 K/UL
LYMPHOCYTES RELATIVE PERCENT: 27 % (ref 24–44)
MCH RBC QN AUTO: 29.3 PG (ref 27–31)
MCHC RBC AUTO-ENTMCNC: 32.3 G/DL (ref 32–36)
MCV RBC AUTO: 90.7 FL (ref 78–100)
MONOCYTES NFR BLD: 0.88 K/UL
MONOCYTES NFR BLD: 12 % (ref 0–5)
NEUTROPHILS NFR BLD: 60 % (ref 36–66)
NEUTS SEG NFR BLD: 4.33 K/UL
PLATELET # BLD AUTO: 186 K/UL (ref 140–440)
PMV BLD AUTO: 9.6 FL (ref 7.5–11.1)
RBC # BLD AUTO: 4.1 M/UL (ref 4.6–6.2)
TIBC SERPL-MCNC: 227 UG/DL (ref 260–445)
UNSATURATED IRON BINDING CAPACITY: 157 UG/DL (ref 110–370)
WBC OTHER # BLD: 7.3 K/UL (ref 4–10.5)

## 2025-06-13 PROCEDURE — 85025 COMPLETE CBC W/AUTO DIFF WBC: CPT

## 2025-06-13 PROCEDURE — 83550 IRON BINDING TEST: CPT

## 2025-06-13 PROCEDURE — 36415 COLL VENOUS BLD VENIPUNCTURE: CPT

## 2025-06-13 PROCEDURE — 82728 ASSAY OF FERRITIN: CPT

## 2025-06-13 PROCEDURE — 83540 ASSAY OF IRON: CPT

## 2025-06-20 ENCOUNTER — HOSPITAL ENCOUNTER (OUTPATIENT)
Dept: INFUSION THERAPY | Age: 80
Discharge: HOME | End: 2025-06-20
Payer: MEDICARE

## 2025-06-20 ENCOUNTER — OFFICE VISIT (OUTPATIENT)
Dept: ONCOLOGY | Age: 80
End: 2025-06-20
Payer: MEDICARE

## 2025-06-20 VITALS
WEIGHT: 196.6 LBS | BODY MASS INDEX: 30.86 KG/M2 | HEIGHT: 67 IN | DIASTOLIC BLOOD PRESSURE: 55 MMHG | SYSTOLIC BLOOD PRESSURE: 122 MMHG | TEMPERATURE: 98.2 F | OXYGEN SATURATION: 96 % | HEART RATE: 87 BPM

## 2025-06-20 DIAGNOSIS — D64.9 ANEMIA, UNSPECIFIED TYPE: Primary | ICD-10-CM

## 2025-06-20 PROCEDURE — 99212 OFFICE O/P EST SF 10 MIN: CPT

## 2025-06-20 PROCEDURE — 3078F DIAST BP <80 MM HG: CPT | Performed by: INTERNAL MEDICINE

## 2025-06-20 PROCEDURE — 3074F SYST BP LT 130 MM HG: CPT | Performed by: INTERNAL MEDICINE

## 2025-06-20 PROCEDURE — 1125F AMNT PAIN NOTED PAIN PRSNT: CPT | Performed by: INTERNAL MEDICINE

## 2025-06-20 PROCEDURE — 99213 OFFICE O/P EST LOW 20 MIN: CPT | Performed by: INTERNAL MEDICINE

## 2025-06-20 PROCEDURE — 1123F ACP DISCUSS/DSCN MKR DOCD: CPT | Performed by: INTERNAL MEDICINE
